# Patient Record
Sex: FEMALE | URBAN - METROPOLITAN AREA
[De-identification: names, ages, dates, MRNs, and addresses within clinical notes are randomized per-mention and may not be internally consistent; named-entity substitution may affect disease eponyms.]

---

## 2023-12-14 ENCOUNTER — APPOINTMENT (RX ONLY)
Dept: URBAN - METROPOLITAN AREA CLINIC 33 | Facility: CLINIC | Age: 48
Setting detail: DERMATOLOGY
End: 2023-12-14

## 2023-12-14 DIAGNOSIS — L65.9 NONSCARRING HAIR LOSS, UNSPECIFIED: ICD-10-CM

## 2023-12-14 PROCEDURE — ? COUNSELING

## 2023-12-14 PROCEDURE — 99202 OFFICE O/P NEW SF 15 MIN: CPT

## 2023-12-14 PROCEDURE — ? ADDITIONAL NOTES

## 2023-12-14 PROCEDURE — ? PHOTO-DOCUMENTATION

## 2023-12-14 ASSESSMENT — LOCATION SIMPLE DESCRIPTION DERM: LOCATION SIMPLE: POSTERIOR SCALP

## 2023-12-14 ASSESSMENT — LOCATION DETAILED DESCRIPTION DERM: LOCATION DETAILED: POSTERIOR MID-PARIETAL SCALP

## 2023-12-14 ASSESSMENT — LOCATION ZONE DERM: LOCATION ZONE: SCALP

## 2023-12-14 NOTE — PROCEDURE: ADDITIONAL NOTES
Additional Notes: No active dermatitis or features of inflammation today. Patient reports hx of biopsy for condition (she does not recall results) and cites PRP injections as most helpful. Will request dermatology records and schedule PRP consult with Dr. Nielsen.
Detail Level: Simple
Render Risk Assessment In Note?: no

## 2023-12-19 ENCOUNTER — APPOINTMENT (RX ONLY)
Dept: URBAN - METROPOLITAN AREA CLINIC 33 | Facility: CLINIC | Age: 48
Setting detail: DERMATOLOGY
End: 2023-12-19

## 2023-12-19 DIAGNOSIS — L65.9 NONSCARRING HAIR LOSS, UNSPECIFIED: ICD-10-CM

## 2023-12-19 PROCEDURE — ? ADDITIONAL NOTES

## 2023-12-19 PROCEDURE — ? COUNSELING

## 2023-12-19 PROCEDURE — ? COSMETIC CONSULTATION: HAIR LOSS

## 2023-12-19 ASSESSMENT — LOCATION DETAILED DESCRIPTION DERM: LOCATION DETAILED: POSTERIOR MID-PARIETAL SCALP

## 2023-12-19 ASSESSMENT — LOCATION ZONE DERM: LOCATION ZONE: SCALP

## 2023-12-19 ASSESSMENT — LOCATION SIMPLE DESCRIPTION DERM: LOCATION SIMPLE: POSTERIOR SCALP

## 2023-12-19 NOTE — PROCEDURE: ADDITIONAL NOTES
Additional Notes: Discussed with pt that there is evidence of scarring. We will request pt records once again from previous dermatologist . Discussed that PRP can have mixed benefits.\\nQuoted pt 650$ for PRP.
Render Risk Assessment In Note?: no
Detail Level: Simple

## 2023-12-19 NOTE — HPI: COSMETIC CONSULTATION
Additional History: Pt is here for PRP consultation. Pt has done PRP in the past  as well as tried minoxidil  and steroid inj in the past. Pt has seen improvement with PRP. Pt is still currently treating with minoxidil.

## 2024-01-18 ENCOUNTER — APPOINTMENT (RX ONLY)
Dept: URBAN - METROPOLITAN AREA CLINIC 33 | Facility: CLINIC | Age: 49
Setting detail: DERMATOLOGY
End: 2024-01-18

## 2024-01-18 DIAGNOSIS — L65.9 NONSCARRING HAIR LOSS, UNSPECIFIED: ICD-10-CM

## 2024-01-18 PROCEDURE — ? INVENTORY

## 2024-01-18 PROCEDURE — ? PLATELET RICH PLASMA INJECTION

## 2024-01-18 NOTE — PROCEDURE: PLATELET RICH PLASMA INJECTION
Amount Of Blood Collected (Optional - Include Units): 30 mL
Location #1: scalp
Depth In Mm (Will Not Render If 0): 0
Venipuncture Paragraph: An alcohol pad was applied to the venipuncture site. Venipuncture was performed using a butterfly needle. Pressure and a bandaid was applied to the site. No complications were noted.
Anesthesia Type: 1% lidocaine with epinephrine
Number Of Tubes Drawn: 1
Post-Care Instructions: After the procedure, take precautions agains sun exposure. Do not apply sunscreen for 12 hours after the procedure. Do not apply make-up for 12 hours after the procedure. Avoid alcohol based toners for 10-14 days. After 2-3 days patients can return to their regular skin regimen.
Detail Level: Zone
Show Additional Techniques: Yes
External Cooling Fan Speed: 5
Which Technique?: Default
Site Of Venipuncture?: Right Anticubital Fossa
Consent: Written consent obtained, risks reviewed including but not limited to pain, scarring, infection and incomplete improvement.  Patient understands the procedure is cosmetic in nature and will require out of pocket payment.
Standard Default Technique For Making Prp: An alcohol pad was applied to the venipuncture site. Venipuncture was performed using a butterfly needle.\\nPressure and a bandaid was applied to the site. No complications were noted.\\nA peripheral blood sample of 30 ml was collected via the ProGen Eclipse PRP system. The tube was gently inverted prior to placement in the centrifuge. The specimen was placed in the centrifuge for 10 minutes at 1600 rpm. Platelet\\npoor plasma was removed from the specimen tube. The tube was gently inverted to re-suspend the platelets in the remaining plasma. The platelet rich plasma was collected and injected with a 27g needle above the periosteum with aliquots of 0.3-0.5cc spaced 1-inch apart.

## 2024-01-18 NOTE — HPI: COSMETIC PROCEDURE
Additional History: Patient is here for PRP treatment on the scalp. Patient has had PRP done once before at another practice. Patient understands today is a cosmetic charge.

## 2024-03-14 ENCOUNTER — APPOINTMENT (RX ONLY)
Dept: URBAN - METROPOLITAN AREA CLINIC 33 | Facility: CLINIC | Age: 49
Setting detail: DERMATOLOGY
End: 2024-03-14

## 2024-03-14 DIAGNOSIS — L65.9 NONSCARRING HAIR LOSS, UNSPECIFIED: ICD-10-CM

## 2024-03-14 PROCEDURE — ? INVENTORY

## 2024-03-14 PROCEDURE — ? PLATELET RICH PLASMA INJECTION

## 2024-03-14 NOTE — PROCEDURE: PLATELET RICH PLASMA INJECTION
Amount Of Blood Collected (Optional - Include Units): 30mL
Post-Care Instructions: After the procedure, take precautions agains sun exposure. Do not apply sunscreen for 12 hours after the procedure. Do not apply make-up for 12 hours after the procedure. Avoid alcohol based toners for 10-14 days. After 2-3 days patients can return to their regular skin regimen.
Amount Injected At This Location In Cc (Will Not Render If 0): 0
Venipuncture Paragraph: An alcohol pad was applied to the venipuncture site. Venipuncture was performed using a butterfly needle. Pressure and a bandaid was applied to the site. No complications were noted.
Consent: Written consent obtained, risks reviewed including but not limited to pain, scarring, infection and incomplete improvement.  Patient understands the procedure is cosmetic in nature and will require out of pocket payment.
Show Additional Techniques: Yes
Depth In Mm (Will Not Render If 0): 0.5
External Cooling Fan Speed: 5
Anesthesia Type: 1% lidocaine with epinephrine
Site Of Venipuncture?: L anticubital fossa
Standard Default Technique For Making Prp: Indication: Hair Loss\\nTreatment Number: 1\\nWritten consent obtained, risks reviewed including but not limited to pain, scarring, infection and incomplete\\nimprovement. Patient understands the procedure is cosmetic in nature and will require out of pocket payment.\\nAn alcohol pad was applied to the venipuncture site. Venipuncture was performed using a butterfly needle.\\nPressure and a bandaid was applied to the site. No complications were noted.\\nA peripheral blood sample of 30 ml was collected via the Pro[employer] Eclipse PRP system. The tube was gently inverted prior\\nto placement in the centrifuge. The specimen was placed in the centrifuge for 10 minutes at 3500 rpm. Platelet\\npoor plasma was removed from the specimen tube. The tube was gently inverted to re-suspend the\\nplatelets in the remaining plasma. The platelet rich plasma was collected and injected with a 27g needle above\\nthe periosteum with aliquots of 0.3-0.5cc spaced 1-inch apart.
Detail Level: Zone
Which Technique?: Default